# Patient Record
Sex: FEMALE | Race: WHITE | NOT HISPANIC OR LATINO | URBAN - METROPOLITAN AREA
[De-identification: names, ages, dates, MRNs, and addresses within clinical notes are randomized per-mention and may not be internally consistent; named-entity substitution may affect disease eponyms.]

---

## 2021-09-04 ENCOUNTER — EMERGENCY (EMERGENCY)
Facility: HOSPITAL | Age: 61
LOS: 1 days | Discharge: HOME | End: 2021-09-04
Attending: EMERGENCY MEDICINE
Payer: COMMERCIAL

## 2021-09-04 VITALS
DIASTOLIC BLOOD PRESSURE: 102 MMHG | TEMPERATURE: 99 F | SYSTOLIC BLOOD PRESSURE: 166 MMHG | OXYGEN SATURATION: 96 % | HEIGHT: 67 IN | HEART RATE: 101 BPM | RESPIRATION RATE: 18 BRPM | WEIGHT: 265 LBS

## 2021-09-04 VITALS
OXYGEN SATURATION: 96 % | SYSTOLIC BLOOD PRESSURE: 137 MMHG | DIASTOLIC BLOOD PRESSURE: 89 MMHG | RESPIRATION RATE: 18 BRPM

## 2021-09-04 PROCEDURE — 99283 EMERGENCY DEPT VISIT LOW MDM: CPT

## 2021-09-04 PROCEDURE — 29130 APPL FINGER SPLINT STATIC: CPT | Mod: F4

## 2021-09-04 PROCEDURE — 99283 EMERGENCY DEPT VISIT LOW MDM: CPT | Mod: 25

## 2021-09-04 RX ORDER — HYDROXYZINE HCL 10 MG
1 TABLET ORAL
Qty: 15 | Refills: 0
Start: 2021-09-04 | End: 2021-09-08

## 2021-09-04 RX ORDER — HYDROXYZINE HCL 10 MG
1 TABLET ORAL
Qty: 9 | Refills: 0
Start: 2021-09-04 | End: 2021-09-06

## 2021-09-04 RX ORDER — HYDROXYZINE HCL 10 MG
25 TABLET ORAL ONCE
Refills: 0 | Status: COMPLETED | OUTPATIENT
Start: 2021-09-04 | End: 2021-09-04

## 2021-09-04 RX ADMIN — Medication 25 MILLIGRAM(S): at 23:38

## 2021-09-04 NOTE — ED PROVIDER NOTE - ATTENDING CONTRIBUTION TO CARE
AJM: pt with bee sting to 5th digit yesterday now with pain and swelling to finger. seen by urgent care who gave doxycycline, decadron and warm soaks. pt now presenting for pain and swelling. no signs of infection. symptom c/w bee sting. no signs of flexor  tenosynovitis. will encourage RICE, splint, return precautions.

## 2021-09-04 NOTE — ED PROVIDER NOTE - MUSCULOSKELETAL, MLM
left 5th phalanx with erythema and mild edema and warm noted on the 5th phalanx to MCP joint , no strike redness and flexor tendon TTP. CR less than 2 sec

## 2021-09-04 NOTE — ED PROVIDER NOTE - NSFOLLOWUPINSTRUCTIONS_ED_ALL_ED_FT
please continue antibiotic   cold compress and elevation .   Insect Bite or Sting    WHAT YOU NEED TO KNOW:    Most insect bites and stings are not dangerous and go away without treatment. Your symptoms may be mild, or you may develop anaphylaxis. Anaphylaxis is a sudden, life-threatening reaction that needs immediate treatment. Common examples of insects that bite or sting are bees, ticks, mosquitoes, spiders, and ants. Insect bites or stings can lead to diseases such as malaria, West Nile virus, Lyme disease, or [place] Spotted Fever.    DISCHARGE INSTRUCTIONS:    Call your local emergency number (911 in the ) for signs or symptoms of anaphylaxis, such as trouble breathing, swelling in your mouth or throat, or wheezing. You may also have itching, a rash, hives, or feel like you are going to faint.    Return to the emergency department if:   •You are stung on your tongue or in your throat.      •A white area forms around the bite.      •You are sweating badly or have body pain.      •You think you were bitten or stung by a poisonous insect.      Call your doctor if:   •You have a fever.      •The area becomes red, warm, tender, and swollen beyond the area of the bite or sting.      •You have questions or concerns about your condition or care.      Medicines: You may need any of the following:  •Antihistamines decrease itching and rash.      •Epinephrine is used to treat severe allergic reactions such as anaphylaxis.      •Take your medicine as directed. Contact your healthcare provider if you think your medicine is not helping or if you have side effects. Tell him of her if you are allergic to any medicine. Keep a list of the medicines, vitamins, and herbs you take. Include the amounts, and when and why you take them. Bring the list or the pill bottles to follow-up visits. Carry your medicine list with you in case of an emergency.      Steps to take for signs or symptoms of anaphylaxis:   •Immediately give 1 shot of epinephrine only into the outer thigh muscle.  How to Give An Epinephrine Shot Adult           •Leave the shot in place as directed. Your healthcare provider may recommend you leave it in place for up to 10 seconds before you remove it. This helps make sure all of the epinephrine is delivered.      •Call 911 and go to the emergency department, even if the shot improved symptoms. Do not drive yourself. Bring the used epinephrine shot with you.      Safety precautions to take if you are at risk for anaphylaxis:   •Keep 2 shots of epinephrine with you at all times. You may need a second shot, because epinephrine only works for about 20 minutes and symptoms may return. Your healthcare provider can show you and family members how to give the shot. Check the expiration date every month and replace it before it expires.      •Create an action plan. Your healthcare provider can help you create a written plan that explains the allergy and an emergency plan to treat a reaction. The plan explains when to give a second epinephrine shot if symptoms return or do not improve after the first. Give copies of the action plan and emergency instructions to family members, work and school staff, and  providers. Show them how to give a shot of epinephrine.      •Carry medical alert identification. Wear medical alert jewelry or carry a card that says you have an insect allergy. Ask your healthcare provider where to get these items.  Medical Alert Jewelry           If an insect bites or stings you:   •Remove the stinger. Scrape the stinger out with your fingernail, edge of a credit card, or a knife blade. Do not squeeze the wound. Gently wash the area with soap and water.      •Remove the tick. Ticks must be removed as soon as possible so you do not get diseases passed through tick bites. Ask your healthcare provider for more information on tick bites and how to remove ticks.      Care for a bite or sting wound:   •Elevate (raise) the area above the level of your heart, if possible. Prop the area on pillows to keep it raised comfortably. Elevate the area for 10 to 20 minutes each hour or as directed by your healthcare provider.             •Use compresses. Soak a clean washcloth in cold water, wring it out, and put it on the bite or sting. Use the compress for 10 to 20 minutes each hour or as directed by your healthcare provider. After 24 to 48 hours, change to warm compresses.      •Apply a paste. Add water to baking soda to make a thick paste. Put the paste on the area for 5 minutes. Rinse gently to remove the paste.      Prevent another insect bite or sting:   •Do not wear bright-colored or flower-print clothing when you plan to spend time outdoors. Do not use hairspray, perfumes, or aftershave.      •Do not leave food out.      •Empty any standing water and wash container with soap and water every 2 days.      •Put screens on all open windows and doors.      •Put insect repellent that contains DEET on skin that is showing when you go outside. Put insect repellent at the top of your boots, bottom of pant legs, and sleeve cuffs. Wear long sleeves, pants, and shoes.      •Use citronella candles outdoors to help keep mosquitoes away. Put a tick and flea collar on pets.      Follow up with your doctor as directed: Write down your questions so you remember to ask them during your visits. please continue antibiotic   cold compress and elevation .  and motrin over the counter as need it for the pain follow instruction   keep the splint on and call and follow up with primary care within 2-3 days   come back in Er if any fever , chills, strike redness goes higher up the foot and wrist or any new concern   Insect Bite or Sting    WHAT YOU NEED TO KNOW:    Most insect bites and stings are not dangerous and go away without treatment. Your symptoms may be mild, or you may develop anaphylaxis. Anaphylaxis is a sudden, life-threatening reaction that needs immediate treatment. Common examples of insects that bite or sting are bees, ticks, mosquitoes, spiders, and ants. Insect bites or stings can lead to diseases such as malaria, West Nile virus, Lyme disease, or [place] Spotted Fever.    DISCHARGE INSTRUCTIONS:    Call your local emergency number (911 in the [place]) for signs or symptoms of anaphylaxis, such as trouble breathing, swelling in your mouth or throat, or wheezing. You may also have itching, a rash, hives, or feel like you are going to faint.    Return to the emergency department if:   •You are stung on your tongue or in your throat.      •A white area forms around the bite.      •You are sweating badly or have body pain.      •You think you were bitten or stung by a poisonous insect.      Call your doctor if:   •You have a fever.      •The area becomes red, warm, tender, and swollen beyond the area of the bite or sting.      •You have questions or concerns about your condition or care.      Medicines: You may need any of the following:  •Antihistamines decrease itching and rash.      •Epinephrine is used to treat severe allergic reactions such as anaphylaxis.      •Take your medicine as directed. Contact your healthcare provider if you think your medicine is not helping or if you have side effects. Tell him of her if you are allergic to any medicine. Keep a list of the medicines, vitamins, and herbs you take. Include the amounts, and when and why you take them. Bring the list or the pill bottles to follow-up visits. Carry your medicine list with you in case of an emergency.      Steps to take for signs or symptoms of anaphylaxis:   •Immediately give 1 shot of epinephrine only into the outer thigh muscle.  How to Give An Epinephrine Shot Adult           •Leave the shot in place as directed. Your healthcare provider may recommend you leave it in place for up to 10 seconds before you remove it. This helps make sure all of the epinephrine is delivered.      •Call 911 and go to the emergency department, even if the shot improved symptoms. Do not drive yourself. Bring the used epinephrine shot with you.      Safety precautions to take if you are at risk for anaphylaxis:   •Keep 2 shots of epinephrine with you at all times. You may need a second shot, because epinephrine only works for about 20 minutes and symptoms may return. Your healthcare provider can show you and family members how to give the shot. Check the expiration date every month and replace it before it expires.      •Create an action plan. Your healthcare provider can help you create a written plan that explains the allergy and an emergency plan to treat a reaction. The plan explains when to give a second epinephrine shot if symptoms return or do not improve after the first. Give copies of the action plan and emergency instructions to family members, work and school staff, and  providers. Show them how to give a shot of epinephrine.      •Carry medical alert identification. Wear medical alert jewelry or carry a card that says you have an insect allergy. Ask your healthcare provider where to get these items.  Medical Alert Jewelry           If an insect bites or stings you:   •Remove the stinger. Scrape the stinger out with your fingernail, edge of a credit card, or a knife blade. Do not squeeze the wound. Gently wash the area with soap and water.      •Remove the tick. Ticks must be removed as soon as possible so you do not get diseases passed through tick bites. Ask your healthcare provider for more information on tick bites and how to remove ticks.      Care for a bite or sting wound:   •Elevate (raise) the area above the level of your heart, if possible. Prop the area on pillows to keep it raised comfortably. Elevate the area for 10 to 20 minutes each hour or as directed by your healthcare provider.             •Use compresses. Soak a clean washcloth in cold water, wring it out, and put it on the bite or sting. Use the compress for 10 to 20 minutes each hour or as directed by your healthcare provider. After 24 to 48 hours, change to warm compresses.      •Apply a paste. Add water to baking soda to make a thick paste. Put the paste on the area for 5 minutes. Rinse gently to remove the paste.      Prevent another insect bite or sting:   •Do not wear bright-colored or flower-print clothing when you plan to spend time outdoors. Do not use hairspray, perfumes, or aftershave.      •Do not leave food out.      •Empty any standing water and wash container with soap and water every 2 days.      •Put screens on all open windows and doors.      •Put insect repellent that contains DEET on skin that is showing when you go outside. Put insect repellent at the top of your boots, bottom of pant legs, and sleeve cuffs. Wear long sleeves, pants, and shoes.      •Use citronella candles outdoors to help keep mosquitoes away. Put a tick and flea collar on pets.      Follow up with your doctor as directed: Write down your questions so you remember to ask them during your visits.

## 2021-09-04 NOTE — ED PROVIDER NOTE - PATIENT PORTAL LINK FT
You can access the FollowMyHealth Patient Portal offered by [facility] by registering at the following website: [email or web address]. By joining [employer]’s FollowMyHealth portal, you will also be able to view your health information using other applications (apps) compatible with our system.

## 2021-09-04 NOTE — ED ADULT TRIAGE NOTE - CHIEF COMPLAINT QUOTE
patient states that she was stung by a bee yesterday while walking dog, went to  was given decadron, complaining of left hand pinky finger redness, swelling and pain

## 2021-09-04 NOTE — ED PROVIDER NOTE - OBJECTIVE STATEMENT
y.o female PMh of HTN on medication present in ER and  c.o left pinky swollen and redness S.p yesterday as she was running her dog out side she got insect bite most likely with wasp . states becomes red and swollen and at home she use the vinegar and she had f.u in urgent care and she received decadron Im and star doxy that she got one dose . states she is been told by urgent care if any worsen or blacken of the finger   come in ER , denies any trauma after that  pt is right handed

## 2021-09-04 NOTE — ED PROVIDER NOTE - CONSTITUTIONAL, MLM
Well appearing, awake, alert, oriented to person, place, time/situation and in no apparent distress. obese tamale normal...

## 2023-04-27 NOTE — ED ADULT TRIAGE NOTE - ESI TRIAGE ACUITY LEVEL, MLM
[FreeTextEntry1] : 1. Coronary Artery Disease: evident on CT of the heart for calcium scoring with total calcium score of 403, predominately in the LAD. Patient had follow up exercise nuclear stress testing which revealed ECG changes suggestive of ischemia and SPECT images demonstrating mild anterior wall ischemia. Recommended diagnostic cardiac catheterization, however insurance company denied it because patient is asymptomatic. Patient remains asymptomatic. Continue Aspirin 81mg daily. LDL was 75 on most recent labs reviewed from September 2021, however, patient with myalgias on atorvastatin 40mg, 20mg and rosuvastatin 5mg daily. She is currently taking the atorvastatin 20mg daily and dealing with the myalgias. Recommend continuing Zetia 10mg daily. Goal LDL less than 70.\par \par 2. HLD:  LDL was 75 on most recent labs reviewed from September 2021, however, patient with myalgias on atorvastatin 40mg, 20mg and rosuvastatin 5mg daily. She is currently taking the atorvastatin 20mg daily and dealing with the myalgias. Recommend continuing Zetia 10mg daily. Goal LDL less than 70.\par \par The patient has greater than 4 METs activity level without exertional complaints. There are no acute ECG changes, no murmur of aortic stenosis, and no clinical signs of heart failure. Patient is optimized from a cardiology standpoint to undergo the planned surgical procedure.\par \par \par Follow up in 6 months. 4

## 2024-04-28 ENCOUNTER — EMERGENCY (EMERGENCY)
Facility: HOSPITAL | Age: 64
LOS: 1 days | Discharge: HOME | End: 2024-04-28
Attending: EMERGENCY MEDICINE
Payer: COMMERCIAL

## 2024-04-28 VITALS
SYSTOLIC BLOOD PRESSURE: 169 MMHG | DIASTOLIC BLOOD PRESSURE: 86 MMHG | OXYGEN SATURATION: 97 % | RESPIRATION RATE: 16 BRPM | HEART RATE: 74 BPM

## 2024-04-28 VITALS
HEIGHT: 67 IN | TEMPERATURE: 98 F | OXYGEN SATURATION: 98 % | SYSTOLIC BLOOD PRESSURE: 178 MMHG | DIASTOLIC BLOOD PRESSURE: 100 MMHG | WEIGHT: 289.91 LBS | RESPIRATION RATE: 20 BRPM | HEART RATE: 80 BPM

## 2024-04-28 PROCEDURE — 72125 CT NECK SPINE W/O DYE: CPT | Mod: MC

## 2024-04-28 PROCEDURE — 70450 CT HEAD/BRAIN W/O DYE: CPT | Mod: MC

## 2024-04-28 PROCEDURE — 99284 EMERGENCY DEPT VISIT MOD MDM: CPT

## 2024-04-28 PROCEDURE — 99284 EMERGENCY DEPT VISIT MOD MDM: CPT | Mod: 25

## 2024-04-28 PROCEDURE — 72125 CT NECK SPINE W/O DYE: CPT | Mod: 26,MC

## 2024-04-28 PROCEDURE — 70450 CT HEAD/BRAIN W/O DYE: CPT | Mod: 26,MC

## 2024-04-28 RX ORDER — CYCLOBENZAPRINE HYDROCHLORIDE 10 MG/1
10 TABLET, FILM COATED ORAL ONCE
Refills: 0 | Status: COMPLETED | OUTPATIENT
Start: 2024-04-28 | End: 2024-04-28

## 2024-04-28 RX ORDER — ACETAMINOPHEN 500 MG
650 TABLET ORAL ONCE
Refills: 0 | Status: COMPLETED | OUTPATIENT
Start: 2024-04-28 | End: 2024-04-28

## 2024-04-28 RX ORDER — CYCLOBENZAPRINE HYDROCHLORIDE 10 MG/1
1 TABLET, FILM COATED ORAL
Qty: 15 | Refills: 0
Start: 2024-04-28 | End: 2024-05-02

## 2024-04-28 RX ADMIN — Medication 650 MILLIGRAM(S): at 17:31

## 2024-04-28 RX ADMIN — CYCLOBENZAPRINE HYDROCHLORIDE 10 MILLIGRAM(S): 10 TABLET, FILM COATED ORAL at 17:31
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
## 2024-04-28 NOTE — ED ADULT NURSE NOTE - NSFALLUNIVINTERV_ED_ALL_ED
Bed/Stretcher in lowest position, wheels locked, appropriate side rails in place/Call bell, personal items and telephone in reach/Instruct patient to call for assistance before getting out of bed/chair/stretcher/Non-slip footwear applied when patient is off stretcher/[place] to call system/Physically safe environment - no spills, clutter or unnecessary equipment/Purposeful proactive rounding/Room/bathroom lighting operational, light cord in reach
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
## 2024-04-28 NOTE — ED PROVIDER NOTE - CARE PROVIDER_API CALL
Amanda Hinton  Neurosurgery  [address] [place] [id number]  Phone: [phone]  Fax: [phone]  Follow Up Time:
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
## 2024-04-28 NOTE — ED PROVIDER NOTE - PATIENT PORTAL LINK FT
You can access the FollowMyHealth Patient Portal offered by [facility] by registering at the following website: [email or web address]. By joining [employer]’s FollowMyHealth portal, you will also be able to view your health information using other applications (apps) compatible with our system.

## 2024-04-29 PROBLEM — I10 ESSENTIAL (PRIMARY) HYPERTENSION: Chronic | Status: ACTIVE | Noted: 2021-09-04

## 2025-04-29 ENCOUNTER — EMERGENCY (EMERGENCY)
Facility: HOSPITAL | Age: 65
LOS: 1 days | End: 2025-04-29
Attending: EMERGENCY MEDICINE
Payer: MEDICAID

## 2025-04-29 VITALS
RESPIRATION RATE: 22 BRPM | HEART RATE: 89 BPM | OXYGEN SATURATION: 93 % | DIASTOLIC BLOOD PRESSURE: 113 MMHG | SYSTOLIC BLOOD PRESSURE: 177 MMHG | TEMPERATURE: 98 F

## 2025-04-29 LAB
ALBUMIN SERPL ELPH-MCNC: 3.9 G/DL — SIGNIFICANT CHANGE UP (ref 3.3–5.2)
ALP SERPL-CCNC: 104 U/L — SIGNIFICANT CHANGE UP (ref 40–120)
ALT FLD-CCNC: 43 U/L — HIGH
ANION GAP SERPL CALC-SCNC: 15 MMOL/L — SIGNIFICANT CHANGE UP (ref 5–17)
AST SERPL-CCNC: 41 U/L — HIGH
BASOPHILS # BLD AUTO: 0.02 K/UL — SIGNIFICANT CHANGE UP (ref 0–0.2)
BASOPHILS NFR BLD AUTO: 0.2 % — SIGNIFICANT CHANGE UP (ref 0–2)
BILIRUB SERPL-MCNC: 0.4 MG/DL — SIGNIFICANT CHANGE UP (ref 0.4–2)
BUN SERPL-MCNC: 13.5 MG/DL — SIGNIFICANT CHANGE UP (ref 8–20)
CALCIUM SERPL-MCNC: 9.3 MG/DL — SIGNIFICANT CHANGE UP (ref 8.4–10.5)
CHLORIDE SERPL-SCNC: 99 MMOL/L — SIGNIFICANT CHANGE UP (ref 96–108)
CO2 SERPL-SCNC: 24 MMOL/L — SIGNIFICANT CHANGE UP (ref 22–29)
CREAT SERPL-MCNC: 0.66 MG/DL — SIGNIFICANT CHANGE UP (ref 0.5–1.3)
EGFR: 98 ML/MIN/1.73M2 — SIGNIFICANT CHANGE UP
EGFR: 98 ML/MIN/1.73M2 — SIGNIFICANT CHANGE UP
EOSINOPHIL # BLD AUTO: 0.24 K/UL — SIGNIFICANT CHANGE UP (ref 0–0.5)
EOSINOPHIL NFR BLD AUTO: 2.8 % — SIGNIFICANT CHANGE UP (ref 0–6)
FLUAV AG NPH QL: SIGNIFICANT CHANGE UP
FLUBV AG NPH QL: SIGNIFICANT CHANGE UP
GLUCOSE SERPL-MCNC: 119 MG/DL — HIGH (ref 70–99)
HCT VFR BLD CALC: 42.9 % — SIGNIFICANT CHANGE UP (ref 34.5–45)
HGB BLD-MCNC: 14.2 G/DL — SIGNIFICANT CHANGE UP (ref 11.5–15.5)
IMM GRANULOCYTES # BLD AUTO: 0.03 K/UL — SIGNIFICANT CHANGE UP (ref 0–0.07)
IMM GRANULOCYTES NFR BLD AUTO: 0.4 % — SIGNIFICANT CHANGE UP (ref 0–0.9)
LYMPHOCYTES # BLD AUTO: 1.55 K/UL — SIGNIFICANT CHANGE UP (ref 1–3.3)
LYMPHOCYTES NFR BLD AUTO: 18.4 % — SIGNIFICANT CHANGE UP (ref 13–44)
MCHC RBC-ENTMCNC: 33.1 G/DL — SIGNIFICANT CHANGE UP (ref 32–36)
MCHC RBC-ENTMCNC: 33.6 PG — SIGNIFICANT CHANGE UP (ref 27–34)
MCV RBC AUTO: 101.7 FL — HIGH (ref 80–100)
MONOCYTES # BLD AUTO: 0.73 K/UL — SIGNIFICANT CHANGE UP (ref 0–0.9)
MONOCYTES NFR BLD AUTO: 8.7 % — SIGNIFICANT CHANGE UP (ref 2–14)
NEUTROPHILS # BLD AUTO: 5.86 K/UL — SIGNIFICANT CHANGE UP (ref 1.8–7.4)
NEUTROPHILS NFR BLD AUTO: 69.5 % — SIGNIFICANT CHANGE UP (ref 43–77)
NRBC # BLD AUTO: 0 K/UL — SIGNIFICANT CHANGE UP (ref 0–0)
NRBC # FLD: 0 K/UL — SIGNIFICANT CHANGE UP (ref 0–0)
NRBC BLD AUTO-RTO: 0 /100 WBCS — SIGNIFICANT CHANGE UP (ref 0–0)
NT-PROBNP SERPL-SCNC: 115 PG/ML — SIGNIFICANT CHANGE UP (ref 0–300)
PLATELET # BLD AUTO: 259 K/UL — SIGNIFICANT CHANGE UP (ref 150–400)
PMV BLD: 9.5 FL — SIGNIFICANT CHANGE UP (ref 7–13)
POTASSIUM SERPL-MCNC: 5.2 MMOL/L — SIGNIFICANT CHANGE UP (ref 3.5–5.3)
POTASSIUM SERPL-SCNC: 5.2 MMOL/L — SIGNIFICANT CHANGE UP (ref 3.5–5.3)
PROT SERPL-MCNC: 7.1 G/DL — SIGNIFICANT CHANGE UP (ref 6.6–8.7)
RBC # BLD: 4.22 M/UL — SIGNIFICANT CHANGE UP (ref 3.8–5.2)
RBC # FLD: 13.5 % — SIGNIFICANT CHANGE UP (ref 10.3–14.5)
RSV RNA NPH QL NAA+NON-PROBE: SIGNIFICANT CHANGE UP
SARS-COV-2 RNA SPEC QL NAA+PROBE: SIGNIFICANT CHANGE UP
SODIUM SERPL-SCNC: 138 MMOL/L — SIGNIFICANT CHANGE UP (ref 135–145)
SOURCE RESPIRATORY: SIGNIFICANT CHANGE UP
TROPONIN T, HIGH SENSITIVITY RESULT: <6 NG/L — SIGNIFICANT CHANGE UP (ref 0–51)
WBC # BLD: 8.43 K/UL — SIGNIFICANT CHANGE UP (ref 3.8–10.5)
WBC # FLD AUTO: 8.43 K/UL — SIGNIFICANT CHANGE UP (ref 3.8–10.5)

## 2025-04-29 PROCEDURE — 80053 COMPREHEN METABOLIC PANEL: CPT

## 2025-04-29 PROCEDURE — 83880 ASSAY OF NATRIURETIC PEPTIDE: CPT

## 2025-04-29 PROCEDURE — 71046 X-RAY EXAM CHEST 2 VIEWS: CPT

## 2025-04-29 PROCEDURE — 94640 AIRWAY INHALATION TREATMENT: CPT

## 2025-04-29 PROCEDURE — 99284 EMERGENCY DEPT VISIT MOD MDM: CPT | Mod: 25

## 2025-04-29 PROCEDURE — 99053 MED SERV 10PM-8AM 24 HR FAC: CPT

## 2025-04-29 PROCEDURE — 96374 THER/PROPH/DIAG INJ IV PUSH: CPT

## 2025-04-29 PROCEDURE — 84484 ASSAY OF TROPONIN QUANT: CPT

## 2025-04-29 PROCEDURE — 85025 COMPLETE CBC W/AUTO DIFF WBC: CPT

## 2025-04-29 PROCEDURE — 87637 SARSCOV2&INF A&B&RSV AMP PRB: CPT

## 2025-04-29 PROCEDURE — 71046 X-RAY EXAM CHEST 2 VIEWS: CPT | Mod: 26

## 2025-04-29 PROCEDURE — 99285 EMERGENCY DEPT VISIT HI MDM: CPT

## 2025-04-29 PROCEDURE — 36415 COLL VENOUS BLD VENIPUNCTURE: CPT

## 2025-04-29 RX ORDER — IPRATROPIUM BROMIDE AND ALBUTEROL SULFATE .5; 2.5 MG/3ML; MG/3ML
3 SOLUTION RESPIRATORY (INHALATION) ONCE
Refills: 0 | Status: COMPLETED | OUTPATIENT
Start: 2025-04-29 | End: 2025-04-29

## 2025-04-29 RX ORDER — BENZONATATE 100 MG
100 CAPSULE ORAL ONCE
Refills: 0 | Status: COMPLETED | OUTPATIENT
Start: 2025-04-29 | End: 2025-04-29

## 2025-04-29 RX ORDER — DOXYCYCLINE HYCLATE 100 MG
1 TABLET ORAL
Qty: 14 | Refills: 0
Start: 2025-04-29 | End: 2025-05-05

## 2025-04-29 RX ORDER — ALBUTEROL SULFATE 2.5 MG/3ML
2 VIAL, NEBULIZER (ML) INHALATION
Qty: 1 | Refills: 0
Start: 2025-04-29

## 2025-04-29 RX ORDER — ALBUTEROL SULFATE 2.5 MG/3ML
3 VIAL, NEBULIZER (ML) INHALATION
Qty: 12 | Refills: 0
Start: 2025-04-29 | End: 2025-05-08

## 2025-04-29 RX ORDER — HYDROCODONE/HOMATROPINE
5 SYRUP ORAL
Qty: 75 | Refills: 0
Start: 2025-04-29 | End: 2025-05-03

## 2025-04-29 RX ORDER — METHYLPREDNISOLONE ACETATE 80 MG/ML
125 INJECTION, SUSPENSION INTRA-ARTICULAR; INTRALESIONAL; INTRAMUSCULAR; SOFT TISSUE ONCE
Refills: 0 | Status: COMPLETED | OUTPATIENT
Start: 2025-04-29 | End: 2025-04-29

## 2025-04-29 RX ORDER — PREDNISONE 20 MG/1
1 TABLET ORAL
Qty: 5 | Refills: 0
Start: 2025-04-29 | End: 2025-05-03

## 2025-04-29 RX ADMIN — Medication 100 MILLIGRAM(S): at 02:36

## 2025-04-29 RX ADMIN — IPRATROPIUM BROMIDE AND ALBUTEROL SULFATE 3 MILLILITER(S): .5; 2.5 SOLUTION RESPIRATORY (INHALATION) at 02:37

## 2025-04-29 RX ADMIN — METHYLPREDNISOLONE ACETATE 125 MILLIGRAM(S): 80 INJECTION, SUSPENSION INTRA-ARTICULAR; INTRALESIONAL; INTRAMUSCULAR; SOFT TISSUE at 02:36

## 2025-04-29 NOTE — ED ADULT NURSE NOTE - OBJECTIVE STATEMENT
pt comes into ED A&Ox4, c/o persistent minimally productive cough since Jan and recent dx of PNA. pt states medical hx of htn and hld, takes medications as prescribed. pt placed on NC 2L. pt denies cp. pt has no other complaints of pain or discomfort at this time. pt comes into ED A&Ox4, c/o persistent minimally productive cough since Jan and recent dx of PNA. pt states medical hx of htn and hld, takes medications as prescribed. pt placed on NC 4L. pt denies cp. pt has no other complaints of pain or discomfort at this time.

## 2025-04-29 NOTE — ED PROVIDER NOTE - OBJECTIVE STATEMENT
64-year-old female past medical history of hypertension presents with cough and shortness of breath.  Patient reports her symptoms started 2 weeks ago with URI symptoms and nasal congestion.  She reports that she feels the cold has settled in her chest, and she is having persistent nonproductive coughing.  She reports associated wheezing and dyspnea on exertion.  No fevers, chills, chest pain, lower extremity edema.

## 2025-04-29 NOTE — ED PROVIDER NOTE - CLINICAL SUMMARY MEDICAL DECISION MAKING FREE TEXT BOX
Patient reports that she feels much better, and her wheezing has resolved after nebulizer treatments here.  She has no increased respiratory effort and no hypoxia.  She is well-appearing and stable for discharge.  Given the persistence of her symptoms we will treat this likely bronchitis with antibiotics as well as steroids and bronchodilators

## 2025-04-29 NOTE — ED ADULT TRIAGE NOTE - CHIEF COMPLAINT QUOTE
Pt BIBEMS from home for persistent cough and dx of PNA for past 4 days. Pt has dry nonproductive cough.

## 2025-04-29 NOTE — ED PROVIDER NOTE - PATIENT PORTAL LINK FT
You can access the FollowMyHealth Patient Portal offered by [facility] by registering at the following website: [email or web address]. By joining [employer]’s FollowMyHealth portal, you will also be able to view your health information using other applications (apps) compatible with our system.

## 2025-04-29 NOTE — ED ADULT NURSE NOTE - NSFALLUNIVINTERV_ED_ALL_ED
Bed/Stretcher in lowest position, wheels locked, appropriate side rails in place/Call bell, personal items and telephone in reach/Instruct patient to call for assistance before getting out of bed/chair/stretcher/Non-slip footwear applied when patient is off stretcher/[place] to call system/Physically safe environment - no spills, clutter or unnecessary equipment/Purposeful proactive rounding/Room/bathroom lighting operational, light cord in reach

## 2025-05-19 NOTE — ED ADULT TRIAGE NOTE - WEIGHT IN KG
131.5
Assistance with ambulation/Assistance OOB with selected safe patient handling equipment/Communicate Risk of Fall with Harm to all staff/Reinforce activity limits and safety measures with patient and family/Tailored Fall Risk Interventions/Visual Cue: Yellow wristband and red socks/Bed in lowest position, wheels locked, appropriate side rails in place/Call bell, personal items and telephone in reach/Instruct patient to call for assistance before getting out of bed or chair/Non-slip footwear when patient is out of bed/[place] to call system/Physically safe environment - no spills, clutter or unnecessary equipment/Purposeful Proactive Rounding/Room/bathroom lighting operational, light cord in reach
